# Patient Record
Sex: FEMALE | Race: WHITE | NOT HISPANIC OR LATINO | URBAN - METROPOLITAN AREA
[De-identification: names, ages, dates, MRNs, and addresses within clinical notes are randomized per-mention and may not be internally consistent; named-entity substitution may affect disease eponyms.]

---

## 2017-09-20 ENCOUNTER — APPOINTMENT (RX ONLY)
Dept: URBAN - METROPOLITAN AREA CLINIC 121 | Facility: CLINIC | Age: 77
Setting detail: DERMATOLOGY
End: 2017-09-20

## 2017-09-20 PROBLEM — C44.319 BASAL CELL CARCINOMA OF SKIN OF OTHER PARTS OF FACE: Status: ACTIVE | Noted: 2017-09-20

## 2017-09-20 PROCEDURE — ? COUNSELING

## 2017-09-20 PROCEDURE — ? OBSERVATION

## 2017-09-20 PROCEDURE — ? OTHER

## 2017-09-20 PROCEDURE — ? DIAGNOSIS COMMENT

## 2017-09-20 PROCEDURE — 99213 OFFICE O/P EST LOW 20 MIN: CPT

## 2017-09-20 NOTE — PROCEDURE: OTHER
Other (Free Text): No clinical signs of bcc noted
Detail Level: Zone
Note Text (......Xxx Chief Complaint.): This diagnosis correlates with the

## 2017-12-20 ENCOUNTER — APPOINTMENT (RX ONLY)
Dept: URBAN - METROPOLITAN AREA CLINIC 121 | Facility: CLINIC | Age: 77
Setting detail: DERMATOLOGY
End: 2017-12-20

## 2017-12-20 DIAGNOSIS — D49.2 NEOPLASM OF UNSPECIFIED BEHAVIOR OF BONE, SOFT TISSUE, AND SKIN: ICD-10-CM

## 2017-12-20 PROCEDURE — 99213 OFFICE O/P EST LOW 20 MIN: CPT

## 2017-12-20 PROCEDURE — ? COUNSELING

## 2017-12-20 PROCEDURE — ? DEFER

## 2017-12-20 ASSESSMENT — LOCATION ZONE DERM: LOCATION ZONE: NOSE

## 2017-12-20 ASSESSMENT — LOCATION DETAILED DESCRIPTION DERM: LOCATION DETAILED: NASAL DORSUM

## 2017-12-20 ASSESSMENT — LOCATION SIMPLE DESCRIPTION DERM: LOCATION SIMPLE: NOSE

## 2017-12-20 NOTE — PROCEDURE: DEFER
Detail Level: Detailed
Scheduling Instructions (Optional): patient  to d/c all treatments just apply vaseline
Instructions (Optional): 1.2 cm pink patch
Procedure To Be Performed At Next Visit: Biopsy by shave method

## 2018-01-15 ENCOUNTER — APPOINTMENT (RX ONLY)
Dept: URBAN - METROPOLITAN AREA CLINIC 121 | Facility: CLINIC | Age: 78
Setting detail: DERMATOLOGY
End: 2018-01-15

## 2018-01-15 DIAGNOSIS — L71.8 OTHER ROSACEA: ICD-10-CM

## 2018-01-15 DIAGNOSIS — D49.2 NEOPLASM OF UNSPECIFIED BEHAVIOR OF BONE, SOFT TISSUE, AND SKIN: ICD-10-CM | Status: RESOLVED

## 2018-01-15 PROCEDURE — ? DIAGNOSIS COMMENT

## 2018-01-15 PROCEDURE — ? COUNSELING

## 2018-01-15 PROCEDURE — 99213 OFFICE O/P EST LOW 20 MIN: CPT

## 2018-01-15 PROCEDURE — ? PRESCRIPTION

## 2018-01-15 RX ORDER — METRONIDAZOLE 7.5 MG/G
LOTION TOPICAL
Qty: 1 | Refills: 3 | Status: ERX | COMMUNITY
Start: 2018-01-15

## 2018-01-15 RX ADMIN — METRONIDAZOLE: 7.5 LOTION TOPICAL at 00:00

## 2018-01-15 ASSESSMENT — LOCATION DETAILED DESCRIPTION DERM: LOCATION DETAILED: NASAL SUPRATIP

## 2018-01-15 ASSESSMENT — LOCATION ZONE DERM: LOCATION ZONE: NOSE

## 2018-01-15 ASSESSMENT — LOCATION SIMPLE DESCRIPTION DERM: LOCATION SIMPLE: NOSE

## 2018-11-28 ENCOUNTER — APPOINTMENT (RX ONLY)
Dept: URBAN - METROPOLITAN AREA CLINIC 121 | Facility: CLINIC | Age: 78
Setting detail: DERMATOLOGY
End: 2018-11-28

## 2018-11-28 DIAGNOSIS — D18.0 HEMANGIOMA: ICD-10-CM

## 2018-11-28 DIAGNOSIS — Z85.828 PERSONAL HISTORY OF OTHER MALIGNANT NEOPLASM OF SKIN: ICD-10-CM

## 2018-11-28 DIAGNOSIS — D22 MELANOCYTIC NEVI: ICD-10-CM

## 2018-11-28 DIAGNOSIS — L81.4 OTHER MELANIN HYPERPIGMENTATION: ICD-10-CM

## 2018-11-28 DIAGNOSIS — L71.8 OTHER ROSACEA: ICD-10-CM | Status: WELL CONTROLLED

## 2018-11-28 DIAGNOSIS — L82.1 OTHER SEBORRHEIC KERATOSIS: ICD-10-CM

## 2018-11-28 PROBLEM — D22.5 MELANOCYTIC NEVI OF TRUNK: Status: ACTIVE | Noted: 2018-11-28

## 2018-11-28 PROBLEM — D18.01 HEMANGIOMA OF SKIN AND SUBCUTANEOUS TISSUE: Status: ACTIVE | Noted: 2018-11-28

## 2018-11-28 PROCEDURE — ? COUNSELING

## 2018-11-28 PROCEDURE — 99214 OFFICE O/P EST MOD 30 MIN: CPT

## 2018-11-28 PROCEDURE — ? TREATMENT REGIMEN

## 2018-11-28 ASSESSMENT — LOCATION DETAILED DESCRIPTION DERM
LOCATION DETAILED: SUPERIOR THORACIC SPINE
LOCATION DETAILED: EPIGASTRIC SKIN
LOCATION DETAILED: NASAL SUPRATIP
LOCATION DETAILED: LEFT MEDIAL UPPER BACK
LOCATION DETAILED: PERIUMBILICAL SKIN

## 2018-11-28 ASSESSMENT — LOCATION SIMPLE DESCRIPTION DERM
LOCATION SIMPLE: ABDOMEN
LOCATION SIMPLE: UPPER BACK
LOCATION SIMPLE: LEFT UPPER BACK
LOCATION SIMPLE: NOSE

## 2018-11-28 ASSESSMENT — LOCATION ZONE DERM
LOCATION ZONE: TRUNK
LOCATION ZONE: NOSE

## 2018-11-28 NOTE — PROCEDURE: MIPS QUALITY
Quality 110: Preventive Care And Screening: Influenza Immunization: Influenza Immunization Administered during Influenza season
Quality 111:Pneumonia Vaccination Status For Older Adults: Pneumococcal Vaccination Administered
Detail Level: Simple

## 2019-08-28 ENCOUNTER — APPOINTMENT (RX ONLY)
Dept: URBAN - METROPOLITAN AREA CLINIC 121 | Facility: CLINIC | Age: 79
Setting detail: DERMATOLOGY
End: 2019-08-28

## 2019-08-28 DIAGNOSIS — D18.0 HEMANGIOMA: ICD-10-CM

## 2019-08-28 DIAGNOSIS — Z85.828 PERSONAL HISTORY OF OTHER MALIGNANT NEOPLASM OF SKIN: ICD-10-CM

## 2019-08-28 DIAGNOSIS — D22 MELANOCYTIC NEVI: ICD-10-CM

## 2019-08-28 DIAGNOSIS — L81.4 OTHER MELANIN HYPERPIGMENTATION: ICD-10-CM

## 2019-08-28 DIAGNOSIS — L82.1 OTHER SEBORRHEIC KERATOSIS: ICD-10-CM

## 2019-08-28 PROBLEM — D18.01 HEMANGIOMA OF SKIN AND SUBCUTANEOUS TISSUE: Status: ACTIVE | Noted: 2019-08-28

## 2019-08-28 PROBLEM — D22.5 MELANOCYTIC NEVI OF TRUNK: Status: ACTIVE | Noted: 2019-08-28

## 2019-08-28 PROCEDURE — ? COUNSELING

## 2019-08-28 PROCEDURE — 99214 OFFICE O/P EST MOD 30 MIN: CPT

## 2019-08-28 ASSESSMENT — LOCATION DETAILED DESCRIPTION DERM
LOCATION DETAILED: LEFT MEDIAL UPPER BACK
LOCATION DETAILED: SUPERIOR THORACIC SPINE
LOCATION DETAILED: EPIGASTRIC SKIN
LOCATION DETAILED: PERIUMBILICAL SKIN

## 2019-08-28 ASSESSMENT — LOCATION ZONE DERM: LOCATION ZONE: TRUNK

## 2019-08-28 ASSESSMENT — LOCATION SIMPLE DESCRIPTION DERM
LOCATION SIMPLE: ABDOMEN
LOCATION SIMPLE: UPPER BACK
LOCATION SIMPLE: LEFT UPPER BACK

## 2021-03-22 ENCOUNTER — OFFICE VISIT (OUTPATIENT)
Age: 81
End: 2021-03-22

## 2021-04-13 ENCOUNTER — OFFICE VISIT (OUTPATIENT)
Age: 81
End: 2021-04-13

## 2021-07-01 ENCOUNTER — OFFICE VISIT (OUTPATIENT)
Age: 81
End: 2021-07-01

## 2021-07-02 ENCOUNTER — TELEPHONE ENCOUNTER (OUTPATIENT)
Dept: URBAN - METROPOLITAN AREA CLINIC 9 | Facility: CLINIC | Age: 81
End: 2021-07-02

## 2021-07-29 ENCOUNTER — OFFICE VISIT (OUTPATIENT)
Dept: URBAN - METROPOLITAN AREA CLINIC 9 | Facility: CLINIC | Age: 81
End: 2021-07-29

## 2021-08-05 ENCOUNTER — OFFICE VISIT (OUTPATIENT)
Dept: URBAN - METROPOLITAN AREA SURGERY CENTER 9 | Facility: SURGERY CENTER | Age: 81
End: 2021-08-05

## 2021-08-31 ENCOUNTER — TELEPHONE ENCOUNTER (OUTPATIENT)
Dept: URBAN - METROPOLITAN AREA CLINIC 9 | Facility: CLINIC | Age: 81
End: 2021-08-31

## 2021-09-16 ENCOUNTER — TELEPHONE ENCOUNTER (OUTPATIENT)
Dept: URBAN - METROPOLITAN AREA CLINIC 9 | Facility: CLINIC | Age: 81
End: 2021-09-16

## 2021-10-13 ENCOUNTER — OFFICE VISIT (OUTPATIENT)
Age: 81
End: 2021-10-13

## 2022-01-28 ENCOUNTER — OFFICE VISIT (OUTPATIENT)
Dept: URBAN - METROPOLITAN AREA CLINIC 9 | Facility: CLINIC | Age: 82
End: 2022-01-28

## 2022-02-22 ENCOUNTER — OFFICE VISIT (OUTPATIENT)
Dept: URBAN - METROPOLITAN AREA CLINIC 9 | Facility: CLINIC | Age: 82
End: 2022-02-22

## 2022-07-30 ENCOUNTER — TELEPHONE ENCOUNTER (OUTPATIENT)
Age: 82
End: 2022-07-30

## 2022-07-30 RX ORDER — LINACLOTIDE 72 UG/1
1 (ONE) CAPSULE, GELATIN COATED ORAL DAILY
Qty: 0 | Refills: 4 | OUTPATIENT
Start: 2021-09-16 | End: 2022-02-22

## 2022-07-31 ENCOUNTER — TELEPHONE ENCOUNTER (OUTPATIENT)
Age: 82
End: 2022-07-31

## 2022-07-31 RX ORDER — LINACLOTIDE 72 UG/1
1 (ONE) CAPSULE, GELATIN COATED ORAL DAILY
Qty: 0 | Refills: 4 | Status: ACTIVE | COMMUNITY
Start: 2021-09-15

## 2022-07-31 RX ORDER — LINACLOTIDE 72 UG/1
1 (ONE) CAPSULE, GELATIN COATED ORAL DAILY
Qty: 0 | Refills: 4 | Status: ACTIVE | COMMUNITY
Start: 2021-07-29

## 2022-07-31 RX ORDER — WHEAT DEXTRIN 3 G/4 G
1 (ONE) POWDER (GRAM) ORAL DAILY
Qty: 0 | Refills: 2 | Status: ACTIVE | COMMUNITY
Start: 2022-01-28

## 2022-07-31 RX ORDER — LINACLOTIDE 72 UG/1
1 (ONE) CAPSULE, GELATIN COATED ORAL DAILY
Qty: 0 | Refills: 4 | Status: ACTIVE | COMMUNITY
Start: 2021-09-16

## 2022-07-31 RX ORDER — LORATADINE 5 MG
17 GRAMS TABLET,CHEWABLE ORAL
Qty: 0 | Refills: 16 | Status: ACTIVE | COMMUNITY
Start: 2022-02-22

## 2022-07-31 RX ORDER — WHEAT DEXTRIN 3 G/4 G
1 (ONE) POWDER (GRAM) ORAL DAILY
Qty: 0 | Refills: 2 | Status: ACTIVE | COMMUNITY
Start: 2022-02-22

## 2022-07-31 RX ORDER — LORATADINE 5 MG
17 GRAMS TABLET,CHEWABLE ORAL DAILY
Qty: 0 | Refills: 16 | Status: ACTIVE | COMMUNITY
Start: 2022-01-28

## 2023-03-31 ENCOUNTER — APPOINTMENT (RX ONLY)
Dept: URBAN - METROPOLITAN AREA CLINIC 116 | Facility: CLINIC | Age: 83
Setting detail: DERMATOLOGY
End: 2023-03-31

## 2023-03-31 DIAGNOSIS — D22 MELANOCYTIC NEVI: ICD-10-CM

## 2023-03-31 DIAGNOSIS — D18.0 HEMANGIOMA: ICD-10-CM

## 2023-03-31 DIAGNOSIS — L82.1 OTHER SEBORRHEIC KERATOSIS: ICD-10-CM

## 2023-03-31 DIAGNOSIS — Z71.89 OTHER SPECIFIED COUNSELING: ICD-10-CM

## 2023-03-31 DIAGNOSIS — L81.4 OTHER MELANIN HYPERPIGMENTATION: ICD-10-CM

## 2023-03-31 PROBLEM — D22.4 MELANOCYTIC NEVI OF SCALP AND NECK: Status: ACTIVE | Noted: 2023-03-31

## 2023-03-31 PROBLEM — D18.01 HEMANGIOMA OF SKIN AND SUBCUTANEOUS TISSUE: Status: ACTIVE | Noted: 2023-03-31

## 2023-03-31 PROCEDURE — ? COUNSELING

## 2023-03-31 PROCEDURE — ? SUNSCREEN RECOMMENDATIONS

## 2023-03-31 PROCEDURE — 99203 OFFICE O/P NEW LOW 30 MIN: CPT

## 2023-03-31 ASSESSMENT — LOCATION ZONE DERM
LOCATION ZONE: SCALP
LOCATION ZONE: LEG
LOCATION ZONE: FACE

## 2023-03-31 ASSESSMENT — LOCATION DETAILED DESCRIPTION DERM
LOCATION DETAILED: RIGHT CENTRAL PARIETAL SCALP
LOCATION DETAILED: RIGHT CENTRAL FRONTAL SCALP
LOCATION DETAILED: RIGHT ANTERIOR PROXIMAL THIGH
LOCATION DETAILED: LEFT SUPERIOR MEDIAL FOREHEAD
LOCATION DETAILED: LEFT SUPERIOR PARIETAL SCALP

## 2023-03-31 ASSESSMENT — LOCATION SIMPLE DESCRIPTION DERM
LOCATION SIMPLE: LEFT FOREHEAD
LOCATION SIMPLE: RIGHT THIGH
LOCATION SIMPLE: RIGHT SCALP
LOCATION SIMPLE: SCALP

## 2023-03-31 NOTE — PROCEDURE: COUNSELING
Detail Level: Zone
Laser Recommendations: Benita Martinez
Detail Level: Generalized
Detail Level: Simple

## 2023-03-31 NOTE — HPI: SKIN LESIONS
Is This A New Presentation, Or A Follow-Up?: Skin Lesions
Quick Note:    Vitamin D level is low.  Please send Rx for 50,000U per week for 3 months.  ______
How Severe Is Your Skin Lesion?: mild

## 2024-10-02 ENCOUNTER — APPOINTMENT (RX ONLY)
Dept: URBAN - METROPOLITAN AREA CLINIC 121 | Facility: CLINIC | Age: 84
Setting detail: DERMATOLOGY
End: 2024-10-02

## 2024-10-02 DIAGNOSIS — L81.4 OTHER MELANIN HYPERPIGMENTATION: ICD-10-CM

## 2024-10-02 DIAGNOSIS — L57.0 ACTINIC KERATOSIS: ICD-10-CM

## 2024-10-02 DIAGNOSIS — D18.0 HEMANGIOMA: ICD-10-CM

## 2024-10-02 DIAGNOSIS — L82.1 OTHER SEBORRHEIC KERATOSIS: ICD-10-CM

## 2024-10-02 PROBLEM — D18.01 HEMANGIOMA OF SKIN AND SUBCUTANEOUS TISSUE: Status: ACTIVE | Noted: 2024-10-02

## 2024-10-02 PROCEDURE — 99213 OFFICE O/P EST LOW 20 MIN: CPT | Mod: 25

## 2024-10-02 PROCEDURE — ? LIQUID NITROGEN

## 2024-10-02 PROCEDURE — 17003 DESTRUCT PREMALG LES 2-14: CPT

## 2024-10-02 PROCEDURE — ? COUNSELING

## 2024-10-02 PROCEDURE — ? SUNSCREEN RECOMMENDATIONS

## 2024-10-02 PROCEDURE — 17000 DESTRUCT PREMALG LESION: CPT

## 2024-10-02 ASSESSMENT — LOCATION ZONE DERM
LOCATION ZONE: FACE
LOCATION ZONE: TRUNK

## 2024-10-02 ASSESSMENT — LOCATION DETAILED DESCRIPTION DERM
LOCATION DETAILED: RIGHT MEDIAL UPPER BACK
LOCATION DETAILED: LEFT CENTRAL MALAR CHEEK
LOCATION DETAILED: RIGHT CENTRAL MALAR CHEEK
LOCATION DETAILED: SUPERIOR THORACIC SPINE

## 2024-10-02 ASSESSMENT — LOCATION SIMPLE DESCRIPTION DERM
LOCATION SIMPLE: RIGHT UPPER BACK
LOCATION SIMPLE: LEFT CHEEK
LOCATION SIMPLE: UPPER BACK
LOCATION SIMPLE: RIGHT CHEEK

## 2024-10-02 NOTE — PROCEDURE: SUNSCREEN RECOMMENDATIONS

## 2024-10-02 NOTE — PROCEDURE: LIQUID NITROGEN
Show Aperture Variable?: Yes
Number Of Freeze-Thaw Cycles: 1 freeze-thaw cycle
Render Post-Care Instructions In Note?: no
Consent: The patient's consent was obtained including but not limited to risks of crusting, scabbing, blistering, scarring, darker or lighter pigmentary change, recurrence, incomplete removal and infection.
Duration Of Freeze Thaw-Cycle (Seconds): 3
Post-Care Instructions: I reviewed with the patient in detail post-care instructions. Patient is to wear sunprotection, and avoid picking at any of the treated lesions. Pt may apply Vaseline to crusted or scabbing areas.
Detail Level: Detailed